# Patient Record
Sex: FEMALE | Race: WHITE | NOT HISPANIC OR LATINO | Employment: OTHER | URBAN - METROPOLITAN AREA
[De-identification: names, ages, dates, MRNs, and addresses within clinical notes are randomized per-mention and may not be internally consistent; named-entity substitution may affect disease eponyms.]

---

## 2020-04-28 ENCOUNTER — APPOINTMENT (OUTPATIENT)
Dept: GENERAL RADIOLOGY | Facility: HOSPITAL | Age: 85
End: 2020-04-28

## 2020-04-28 ENCOUNTER — HOSPITAL ENCOUNTER (INPATIENT)
Facility: HOSPITAL | Age: 85
LOS: 6 days | Discharge: NURSING, REHAB OR LONG-TERM CARE | End: 2020-05-04
Attending: EMERGENCY MEDICINE | Admitting: HOSPITALIST

## 2020-04-28 DIAGNOSIS — F03.91 DEMENTIA WITH BEHAVIORAL DISTURBANCE, UNSPECIFIED DEMENTIA TYPE: Primary | ICD-10-CM

## 2020-04-28 DIAGNOSIS — U07.1 COVID-19 VIRUS DETECTED: ICD-10-CM

## 2020-04-28 DIAGNOSIS — Z20.822 CLOSE EXPOSURE TO COVID-19 VIRUS: ICD-10-CM

## 2020-04-28 PROBLEM — I10 HYPERTENSION: Status: ACTIVE | Noted: 2020-04-28

## 2020-04-28 PROBLEM — C80.1 CANCER (HCC): Status: ACTIVE | Noted: 2020-04-28

## 2020-04-28 PROBLEM — F03.918 DEMENTIA WITH BEHAVIORAL DISTURBANCE (HCC): Status: ACTIVE | Noted: 2020-04-28

## 2020-04-28 PROBLEM — E11.9 DIABETES MELLITUS (HCC): Status: ACTIVE | Noted: 2020-04-28

## 2020-04-28 LAB
ALBUMIN SERPL-MCNC: 3.6 G/DL (ref 3.5–5.2)
ALBUMIN/GLOB SERPL: 1.3 G/DL
ALP SERPL-CCNC: 79 U/L (ref 39–117)
ALT SERPL W P-5'-P-CCNC: 15 U/L (ref 1–33)
AMPHET+METHAMPHET UR QL: NEGATIVE
ANION GAP SERPL CALCULATED.3IONS-SCNC: 10.7 MMOL/L (ref 5–15)
AST SERPL-CCNC: 15 U/L (ref 1–32)
BACTERIA UR QL AUTO: NORMAL /HPF
BARBITURATES UR QL SCN: NEGATIVE
BASOPHILS # BLD AUTO: 0.03 10*3/MM3 (ref 0–0.2)
BASOPHILS NFR BLD AUTO: 0.5 % (ref 0–1.5)
BENZODIAZ UR QL SCN: NEGATIVE
BILIRUB SERPL-MCNC: 0.7 MG/DL (ref 0.2–1.2)
BILIRUB UR QL STRIP: NEGATIVE
BUN BLD-MCNC: 12 MG/DL (ref 8–23)
BUN/CREAT SERPL: 13 (ref 7–25)
CALCIUM SPEC-SCNC: 9.9 MG/DL (ref 8.6–10.5)
CANNABINOIDS SERPL QL: NEGATIVE
CHLORIDE SERPL-SCNC: 95 MMOL/L (ref 98–107)
CLARITY UR: ABNORMAL
CO2 SERPL-SCNC: 25.3 MMOL/L (ref 22–29)
COCAINE UR QL: NEGATIVE
COLOR UR: YELLOW
CREAT BLD-MCNC: 0.92 MG/DL (ref 0.57–1)
DEPRECATED RDW RBC AUTO: 41.5 FL (ref 37–54)
EOSINOPHIL # BLD AUTO: 0.03 10*3/MM3 (ref 0–0.4)
EOSINOPHIL NFR BLD AUTO: 0.5 % (ref 0.3–6.2)
ERYTHROCYTE [DISTWIDTH] IN BLOOD BY AUTOMATED COUNT: 13.3 % (ref 12.3–15.4)
ETHANOL BLD-MCNC: <10 MG/DL (ref 0–10)
ETHANOL UR QL: <0.01 %
GFR SERPL CREATININE-BSD FRML MDRD: 58 ML/MIN/1.73
GFR SERPL CREATININE-BSD FRML MDRD: 70 ML/MIN/1.73
GLOBULIN UR ELPH-MCNC: 2.8 GM/DL
GLUCOSE BLD-MCNC: 128 MG/DL (ref 65–99)
GLUCOSE UR STRIP-MCNC: NEGATIVE MG/DL
HCT VFR BLD AUTO: 33.7 % (ref 34–46.6)
HGB BLD-MCNC: 11.5 G/DL (ref 12–15.9)
HGB UR QL STRIP.AUTO: NEGATIVE
HYALINE CASTS UR QL AUTO: NORMAL /LPF
IMM GRANULOCYTES # BLD AUTO: 0.04 10*3/MM3 (ref 0–0.05)
IMM GRANULOCYTES NFR BLD AUTO: 0.6 % (ref 0–0.5)
KETONES UR QL STRIP: NEGATIVE
LEUKOCYTE ESTERASE UR QL STRIP.AUTO: ABNORMAL
LYMPHOCYTES # BLD AUTO: 2.35 10*3/MM3 (ref 0.7–3.1)
LYMPHOCYTES NFR BLD AUTO: 36.6 % (ref 19.6–45.3)
MCH RBC QN AUTO: 29.3 PG (ref 26.6–33)
MCHC RBC AUTO-ENTMCNC: 34.1 G/DL (ref 31.5–35.7)
MCV RBC AUTO: 86 FL (ref 79–97)
METHADONE UR QL SCN: NEGATIVE
MONOCYTES # BLD AUTO: 0.55 10*3/MM3 (ref 0.1–0.9)
MONOCYTES NFR BLD AUTO: 8.6 % (ref 5–12)
NEUTROPHILS # BLD AUTO: 3.42 10*3/MM3 (ref 1.7–7)
NEUTROPHILS NFR BLD AUTO: 53.2 % (ref 42.7–76)
NITRITE UR QL STRIP: NEGATIVE
NRBC BLD AUTO-RTO: 0 /100 WBC (ref 0–0.2)
OPIATES UR QL: NEGATIVE
OXYCODONE UR QL SCN: NEGATIVE
PH UR STRIP.AUTO: 6.5 [PH] (ref 5–8)
PLATELET # BLD AUTO: 411 10*3/MM3 (ref 140–450)
PMV BLD AUTO: 9.8 FL (ref 6–12)
POTASSIUM BLD-SCNC: 3.4 MMOL/L (ref 3.5–5.2)
PROT SERPL-MCNC: 6.4 G/DL (ref 6–8.5)
PROT UR QL STRIP: NEGATIVE
RBC # BLD AUTO: 3.92 10*6/MM3 (ref 3.77–5.28)
RBC # UR: NORMAL /HPF
REF LAB TEST METHOD: NORMAL
SARS-COV-2 RNA RESP QL NAA+PROBE: DETECTED
SODIUM BLD-SCNC: 131 MMOL/L (ref 136–145)
SP GR UR STRIP: <=1.005 (ref 1–1.03)
SQUAMOUS #/AREA URNS HPF: NORMAL /HPF
UROBILINOGEN UR QL STRIP: ABNORMAL
WBC NRBC COR # BLD: 6.42 10*3/MM3 (ref 3.4–10.8)
WBC UR QL AUTO: NORMAL /HPF

## 2020-04-28 PROCEDURE — 80307 DRUG TEST PRSMV CHEM ANLYZR: CPT | Performed by: EMERGENCY MEDICINE

## 2020-04-28 PROCEDURE — 81001 URINALYSIS AUTO W/SCOPE: CPT | Performed by: PHYSICIAN ASSISTANT

## 2020-04-28 PROCEDURE — 71045 X-RAY EXAM CHEST 1 VIEW: CPT

## 2020-04-28 PROCEDURE — 87635 SARS-COV-2 COVID-19 AMP PRB: CPT | Performed by: PHYSICIAN ASSISTANT

## 2020-04-28 PROCEDURE — 93005 ELECTROCARDIOGRAM TRACING: CPT | Performed by: EMERGENCY MEDICINE

## 2020-04-28 PROCEDURE — 99284 EMERGENCY DEPT VISIT MOD MDM: CPT

## 2020-04-28 PROCEDURE — 85025 COMPLETE CBC W/AUTO DIFF WBC: CPT | Performed by: PHYSICIAN ASSISTANT

## 2020-04-28 PROCEDURE — 25010000002 ZIPRASIDONE MESYLATE PER 10 MG: Performed by: PHYSICIAN ASSISTANT

## 2020-04-28 PROCEDURE — 93010 ELECTROCARDIOGRAM REPORT: CPT | Performed by: INTERNAL MEDICINE

## 2020-04-28 PROCEDURE — 80053 COMPREHEN METABOLIC PANEL: CPT | Performed by: PHYSICIAN ASSISTANT

## 2020-04-28 PROCEDURE — 36415 COLL VENOUS BLD VENIPUNCTURE: CPT

## 2020-04-28 RX ORDER — ONDANSETRON 4 MG/1
4 TABLET, FILM COATED ORAL EVERY 6 HOURS PRN
Status: DISCONTINUED | OUTPATIENT
Start: 2020-04-28 | End: 2020-05-04 | Stop reason: HOSPADM

## 2020-04-28 RX ORDER — ACETAMINOPHEN 325 MG/1
650 TABLET ORAL EVERY 4 HOURS PRN
Status: DISCONTINUED | OUTPATIENT
Start: 2020-04-28 | End: 2020-05-04 | Stop reason: HOSPADM

## 2020-04-28 RX ORDER — METOPROLOL TARTRATE 50 MG/1
50 TABLET, FILM COATED ORAL EVERY 12 HOURS SCHEDULED
Status: DISCONTINUED | OUTPATIENT
Start: 2020-04-28 | End: 2020-05-04 | Stop reason: HOSPADM

## 2020-04-28 RX ORDER — ONDANSETRON 2 MG/ML
4 INJECTION INTRAMUSCULAR; INTRAVENOUS EVERY 6 HOURS PRN
Status: DISCONTINUED | OUTPATIENT
Start: 2020-04-28 | End: 2020-05-04 | Stop reason: HOSPADM

## 2020-04-28 RX ORDER — SPIRONOLACTONE 25 MG/1
25 TABLET ORAL DAILY
Status: DISCONTINUED | OUTPATIENT
Start: 2020-04-29 | End: 2020-05-04 | Stop reason: HOSPADM

## 2020-04-28 RX ORDER — ZIPRASIDONE MESYLATE 20 MG/ML
2.5 INJECTION, POWDER, LYOPHILIZED, FOR SOLUTION INTRAMUSCULAR ONCE
Status: COMPLETED | OUTPATIENT
Start: 2020-04-28 | End: 2020-04-28

## 2020-04-28 RX ORDER — GABAPENTIN 100 MG/1
100 CAPSULE ORAL
COMMUNITY

## 2020-04-28 RX ORDER — METOPROLOL TARTRATE 50 MG/1
50 TABLET, FILM COATED ORAL 2 TIMES DAILY
COMMUNITY

## 2020-04-28 RX ORDER — ACETAMINOPHEN 650 MG/1
650 SUPPOSITORY RECTAL EVERY 4 HOURS PRN
Status: DISCONTINUED | OUTPATIENT
Start: 2020-04-28 | End: 2020-05-04 | Stop reason: HOSPADM

## 2020-04-28 RX ORDER — AMLODIPINE BESYLATE 10 MG/1
10 TABLET ORAL DAILY
COMMUNITY

## 2020-04-28 RX ORDER — SODIUM CHLORIDE 0.9 % (FLUSH) 0.9 %
10 SYRINGE (ML) INJECTION AS NEEDED
Status: DISCONTINUED | OUTPATIENT
Start: 2020-04-28 | End: 2020-05-04 | Stop reason: HOSPADM

## 2020-04-28 RX ORDER — AMLODIPINE BESYLATE 10 MG/1
10 TABLET ORAL DAILY
Status: DISCONTINUED | OUTPATIENT
Start: 2020-04-29 | End: 2020-05-04 | Stop reason: HOSPADM

## 2020-04-28 RX ORDER — ZIPRASIDONE MESYLATE 20 MG/ML
5 INJECTION, POWDER, LYOPHILIZED, FOR SOLUTION INTRAMUSCULAR ONCE
Status: DISCONTINUED | OUTPATIENT
Start: 2020-04-28 | End: 2020-04-28 | Stop reason: SDUPTHER

## 2020-04-28 RX ORDER — ACETAMINOPHEN 160 MG/5ML
650 SOLUTION ORAL EVERY 4 HOURS PRN
Status: DISCONTINUED | OUTPATIENT
Start: 2020-04-28 | End: 2020-05-04 | Stop reason: HOSPADM

## 2020-04-28 RX ORDER — ASPIRIN 81 MG/1
81 TABLET, CHEWABLE ORAL DAILY
COMMUNITY

## 2020-04-28 RX ORDER — QUETIAPINE FUMARATE 25 MG/1
25 TABLET, FILM COATED ORAL NIGHTLY
Status: DISCONTINUED | OUTPATIENT
Start: 2020-04-28 | End: 2020-05-04 | Stop reason: HOSPADM

## 2020-04-28 RX ORDER — SODIUM CHLORIDE 0.9 % (FLUSH) 0.9 %
10 SYRINGE (ML) INJECTION EVERY 12 HOURS SCHEDULED
Status: DISCONTINUED | OUTPATIENT
Start: 2020-04-28 | End: 2020-05-04 | Stop reason: HOSPADM

## 2020-04-28 RX ORDER — SPIRONOLACTONE 25 MG/1
25 TABLET ORAL DAILY
COMMUNITY

## 2020-04-28 RX ORDER — LOSARTAN POTASSIUM 50 MG/1
50 TABLET ORAL DAILY
COMMUNITY

## 2020-04-28 RX ORDER — ZIPRASIDONE MESYLATE 20 MG/ML
5 INJECTION, POWDER, LYOPHILIZED, FOR SOLUTION INTRAMUSCULAR ONCE
Status: COMPLETED | OUTPATIENT
Start: 2020-04-28 | End: 2020-04-28

## 2020-04-28 RX ORDER — QUETIAPINE FUMARATE 25 MG/1
25 TABLET, FILM COATED ORAL NIGHTLY
COMMUNITY

## 2020-04-28 RX ORDER — LOSARTAN POTASSIUM 50 MG/1
50 TABLET ORAL DAILY
Status: DISCONTINUED | OUTPATIENT
Start: 2020-04-29 | End: 2020-05-04 | Stop reason: HOSPADM

## 2020-04-28 RX ORDER — ASPIRIN 81 MG/1
81 TABLET, CHEWABLE ORAL DAILY
Status: DISCONTINUED | OUTPATIENT
Start: 2020-04-29 | End: 2020-05-04 | Stop reason: HOSPADM

## 2020-04-28 RX ADMIN — METOPROLOL TARTRATE 50 MG: 25 TABLET ORAL at 22:33

## 2020-04-28 RX ADMIN — QUETIAPINE FUMARATE 25 MG: 25 TABLET ORAL at 22:33

## 2020-04-28 RX ADMIN — ZIPRASIDONE MESYLATE 5 MG: 20 INJECTION, POWDER, LYOPHILIZED, FOR SOLUTION INTRAMUSCULAR at 17:32

## 2020-04-28 RX ADMIN — ZIPRASIDONE MESYLATE 2.5 MG: 20 INJECTION, POWDER, LYOPHILIZED, FOR SOLUTION INTRAMUSCULAR at 16:01

## 2020-04-29 PROBLEM — R62.7 FAILURE TO THRIVE IN ADULT: Status: ACTIVE | Noted: 2020-04-29

## 2020-04-29 LAB
ALBUMIN SERPL-MCNC: 3.2 G/DL (ref 3.5–5.2)
ALBUMIN/GLOB SERPL: 1.2 G/DL
ALP SERPL-CCNC: 72 U/L (ref 39–117)
ALT SERPL W P-5'-P-CCNC: 12 U/L (ref 1–33)
ANION GAP SERPL CALCULATED.3IONS-SCNC: 10.3 MMOL/L (ref 5–15)
AST SERPL-CCNC: 10 U/L (ref 1–32)
BASOPHILS # BLD AUTO: 0.03 10*3/MM3 (ref 0–0.2)
BASOPHILS NFR BLD AUTO: 0.5 % (ref 0–1.5)
BILIRUB SERPL-MCNC: 0.7 MG/DL (ref 0.2–1.2)
BUN BLD-MCNC: 11 MG/DL (ref 8–23)
BUN/CREAT SERPL: 12.6 (ref 7–25)
CALCIUM SPEC-SCNC: 9.2 MG/DL (ref 8.6–10.5)
CHLORIDE SERPL-SCNC: 103 MMOL/L (ref 98–107)
CO2 SERPL-SCNC: 25.7 MMOL/L (ref 22–29)
CREAT BLD-MCNC: 0.87 MG/DL (ref 0.57–1)
CRP SERPL-MCNC: 0.64 MG/DL (ref 0–0.5)
DEPRECATED RDW RBC AUTO: 41.3 FL (ref 37–54)
EOSINOPHIL # BLD AUTO: 0.1 10*3/MM3 (ref 0–0.4)
EOSINOPHIL NFR BLD AUTO: 1.6 % (ref 0.3–6.2)
ERYTHROCYTE [DISTWIDTH] IN BLOOD BY AUTOMATED COUNT: 13.2 % (ref 12.3–15.4)
FERRITIN SERPL-MCNC: 173 NG/ML (ref 13–150)
GFR SERPL CREATININE-BSD FRML MDRD: 62 ML/MIN/1.73
GLOBULIN UR ELPH-MCNC: 2.6 GM/DL
GLUCOSE BLD-MCNC: 105 MG/DL (ref 65–99)
HBA1C MFR BLD: 6.26 % (ref 4.8–5.6)
HCT VFR BLD AUTO: 32.4 % (ref 34–46.6)
HGB BLD-MCNC: 11.2 G/DL (ref 12–15.9)
IMM GRANULOCYTES # BLD AUTO: 0.05 10*3/MM3 (ref 0–0.05)
IMM GRANULOCYTES NFR BLD AUTO: 0.8 % (ref 0–0.5)
LDH SERPL-CCNC: 167 U/L (ref 135–214)
LYMPHOCYTES # BLD AUTO: 2.11 10*3/MM3 (ref 0.7–3.1)
LYMPHOCYTES NFR BLD AUTO: 34.1 % (ref 19.6–45.3)
MCH RBC QN AUTO: 29.5 PG (ref 26.6–33)
MCHC RBC AUTO-ENTMCNC: 34.6 G/DL (ref 31.5–35.7)
MCV RBC AUTO: 85.3 FL (ref 79–97)
MONOCYTES # BLD AUTO: 0.52 10*3/MM3 (ref 0.1–0.9)
MONOCYTES NFR BLD AUTO: 8.4 % (ref 5–12)
NEUTROPHILS # BLD AUTO: 3.37 10*3/MM3 (ref 1.7–7)
NEUTROPHILS NFR BLD AUTO: 54.6 % (ref 42.7–76)
NRBC BLD AUTO-RTO: 0 /100 WBC (ref 0–0.2)
PLATELET # BLD AUTO: 446 10*3/MM3 (ref 140–450)
PMV BLD AUTO: 9.8 FL (ref 6–12)
POTASSIUM BLD-SCNC: 3.8 MMOL/L (ref 3.5–5.2)
PROT SERPL-MCNC: 5.8 G/DL (ref 6–8.5)
RBC # BLD AUTO: 3.8 10*6/MM3 (ref 3.77–5.28)
SODIUM BLD-SCNC: 139 MMOL/L (ref 136–145)
WBC NRBC COR # BLD: 6.18 10*3/MM3 (ref 3.4–10.8)

## 2020-04-29 PROCEDURE — 82728 ASSAY OF FERRITIN: CPT | Performed by: HOSPITALIST

## 2020-04-29 PROCEDURE — 83615 LACTATE (LD) (LDH) ENZYME: CPT | Performed by: HOSPITALIST

## 2020-04-29 PROCEDURE — 80053 COMPREHEN METABOLIC PANEL: CPT | Performed by: HOSPITALIST

## 2020-04-29 PROCEDURE — 85025 COMPLETE CBC W/AUTO DIFF WBC: CPT | Performed by: HOSPITALIST

## 2020-04-29 PROCEDURE — 86140 C-REACTIVE PROTEIN: CPT | Performed by: NURSE PRACTITIONER

## 2020-04-29 PROCEDURE — 83036 HEMOGLOBIN GLYCOSYLATED A1C: CPT | Performed by: NURSE PRACTITIONER

## 2020-04-29 RX ORDER — OLANZAPINE 10 MG/1
5 INJECTION, POWDER, LYOPHILIZED, FOR SOLUTION INTRAMUSCULAR EVERY 8 HOURS PRN
Status: DISCONTINUED | OUTPATIENT
Start: 2020-04-29 | End: 2020-05-04 | Stop reason: HOSPADM

## 2020-04-29 RX ADMIN — SPIRONOLACTONE 25 MG: 25 TABLET, FILM COATED ORAL at 08:05

## 2020-04-29 RX ADMIN — METOPROLOL TARTRATE 50 MG: 25 TABLET ORAL at 08:05

## 2020-04-29 RX ADMIN — SODIUM CHLORIDE, PRESERVATIVE FREE 10 ML: 5 INJECTION INTRAVENOUS at 21:06

## 2020-04-29 RX ADMIN — LOSARTAN POTASSIUM 50 MG: 50 TABLET, FILM COATED ORAL at 08:05

## 2020-04-29 RX ADMIN — QUETIAPINE FUMARATE 25 MG: 25 TABLET ORAL at 21:04

## 2020-04-29 RX ADMIN — AMLODIPINE BESYLATE 10 MG: 10 TABLET ORAL at 08:05

## 2020-04-29 RX ADMIN — SODIUM CHLORIDE, PRESERVATIVE FREE 10 ML: 5 INJECTION INTRAVENOUS at 08:17

## 2020-04-29 RX ADMIN — ASPIRIN 81 MG: 81 TABLET, CHEWABLE ORAL at 08:05

## 2020-04-29 RX ADMIN — METOPROLOL TARTRATE 50 MG: 25 TABLET ORAL at 21:04

## 2020-04-29 RX ADMIN — ACETAMINOPHEN 650 MG: 325 TABLET, FILM COATED ORAL at 08:05

## 2020-04-30 LAB
ALBUMIN SERPL-MCNC: 3.6 G/DL (ref 3.5–5.2)
ALBUMIN/GLOB SERPL: 1.3 G/DL
ALP SERPL-CCNC: 80 U/L (ref 39–117)
ALT SERPL W P-5'-P-CCNC: 22 U/L (ref 1–33)
ANION GAP SERPL CALCULATED.3IONS-SCNC: 10.8 MMOL/L (ref 5–15)
AST SERPL-CCNC: 20 U/L (ref 1–32)
BASOPHILS # BLD AUTO: 0.05 10*3/MM3 (ref 0–0.2)
BASOPHILS NFR BLD AUTO: 0.6 % (ref 0–1.5)
BILIRUB SERPL-MCNC: 0.8 MG/DL (ref 0.2–1.2)
BUN BLD-MCNC: 14 MG/DL (ref 8–23)
BUN/CREAT SERPL: 14.3 (ref 7–25)
CALCIUM SPEC-SCNC: 9.8 MG/DL (ref 8.6–10.5)
CHLORIDE SERPL-SCNC: 97 MMOL/L (ref 98–107)
CO2 SERPL-SCNC: 27.2 MMOL/L (ref 22–29)
CREAT BLD-MCNC: 0.98 MG/DL (ref 0.57–1)
D-LACTATE SERPL-SCNC: 0.7 MMOL/L (ref 0.5–2)
DEPRECATED RDW RBC AUTO: 42 FL (ref 37–54)
EOSINOPHIL # BLD AUTO: 0.11 10*3/MM3 (ref 0–0.4)
EOSINOPHIL NFR BLD AUTO: 1.3 % (ref 0.3–6.2)
ERYTHROCYTE [DISTWIDTH] IN BLOOD BY AUTOMATED COUNT: 13.3 % (ref 12.3–15.4)
GFR SERPL CREATININE-BSD FRML MDRD: 54 ML/MIN/1.73
GLOBULIN UR ELPH-MCNC: 2.8 GM/DL
GLUCOSE BLD-MCNC: 128 MG/DL (ref 65–99)
HCT VFR BLD AUTO: 38 % (ref 34–46.6)
HGB BLD-MCNC: 12.9 G/DL (ref 12–15.9)
IMM GRANULOCYTES # BLD AUTO: 0.08 10*3/MM3 (ref 0–0.05)
IMM GRANULOCYTES NFR BLD AUTO: 1 % (ref 0–0.5)
LYMPHOCYTES # BLD AUTO: 2.52 10*3/MM3 (ref 0.7–3.1)
LYMPHOCYTES NFR BLD AUTO: 30.8 % (ref 19.6–45.3)
MCH RBC QN AUTO: 29.5 PG (ref 26.6–33)
MCHC RBC AUTO-ENTMCNC: 33.9 G/DL (ref 31.5–35.7)
MCV RBC AUTO: 86.8 FL (ref 79–97)
MONOCYTES # BLD AUTO: 0.44 10*3/MM3 (ref 0.1–0.9)
MONOCYTES NFR BLD AUTO: 5.4 % (ref 5–12)
NEUTROPHILS # BLD AUTO: 4.99 10*3/MM3 (ref 1.7–7)
NEUTROPHILS NFR BLD AUTO: 60.9 % (ref 42.7–76)
NRBC BLD AUTO-RTO: 0 /100 WBC (ref 0–0.2)
PLATELET # BLD AUTO: 540 10*3/MM3 (ref 140–450)
PMV BLD AUTO: 9.2 FL (ref 6–12)
POTASSIUM BLD-SCNC: 4 MMOL/L (ref 3.5–5.2)
PROT SERPL-MCNC: 6.4 G/DL (ref 6–8.5)
RBC # BLD AUTO: 4.38 10*6/MM3 (ref 3.77–5.28)
SODIUM BLD-SCNC: 135 MMOL/L (ref 136–145)
TROPONIN T SERPL-MCNC: <0.01 NG/ML (ref 0–0.03)
WBC NRBC COR # BLD: 8.19 10*3/MM3 (ref 3.4–10.8)

## 2020-04-30 PROCEDURE — 85025 COMPLETE CBC W/AUTO DIFF WBC: CPT | Performed by: HOSPITALIST

## 2020-04-30 PROCEDURE — 83605 ASSAY OF LACTIC ACID: CPT | Performed by: HOSPITALIST

## 2020-04-30 PROCEDURE — 84484 ASSAY OF TROPONIN QUANT: CPT | Performed by: HOSPITALIST

## 2020-04-30 PROCEDURE — 97162 PT EVAL MOD COMPLEX 30 MIN: CPT

## 2020-04-30 PROCEDURE — 80053 COMPREHEN METABOLIC PANEL: CPT | Performed by: HOSPITALIST

## 2020-04-30 PROCEDURE — 97110 THERAPEUTIC EXERCISES: CPT

## 2020-04-30 PROCEDURE — 90791 PSYCH DIAGNOSTIC EVALUATION: CPT | Performed by: SOCIAL WORKER

## 2020-04-30 RX ORDER — ONDANSETRON 4 MG/1
4 TABLET, FILM COATED ORAL EVERY 6 HOURS PRN
Status: DISCONTINUED | OUTPATIENT
Start: 2020-04-30 | End: 2020-05-04 | Stop reason: HOSPADM

## 2020-04-30 RX ORDER — NITROGLYCERIN 0.4 MG/1
0.4 TABLET SUBLINGUAL
Status: DISCONTINUED | OUTPATIENT
Start: 2020-04-30 | End: 2020-05-04 | Stop reason: HOSPADM

## 2020-04-30 RX ORDER — ONDANSETRON 2 MG/ML
4 INJECTION INTRAMUSCULAR; INTRAVENOUS EVERY 6 HOURS PRN
Status: DISCONTINUED | OUTPATIENT
Start: 2020-04-30 | End: 2020-05-04 | Stop reason: HOSPADM

## 2020-04-30 RX ORDER — ACETAMINOPHEN 325 MG/1
650 TABLET ORAL EVERY 4 HOURS PRN
Status: DISCONTINUED | OUTPATIENT
Start: 2020-04-30 | End: 2020-05-04 | Stop reason: HOSPADM

## 2020-04-30 RX ADMIN — METOPROLOL TARTRATE 50 MG: 25 TABLET ORAL at 23:12

## 2020-04-30 RX ADMIN — LOSARTAN POTASSIUM 50 MG: 50 TABLET, FILM COATED ORAL at 08:23

## 2020-04-30 RX ADMIN — SODIUM CHLORIDE, PRESERVATIVE FREE 10 ML: 5 INJECTION INTRAVENOUS at 08:24

## 2020-04-30 RX ADMIN — ASPIRIN 81 MG: 81 TABLET, CHEWABLE ORAL at 08:23

## 2020-04-30 RX ADMIN — SPIRONOLACTONE 25 MG: 25 TABLET, FILM COATED ORAL at 08:23

## 2020-04-30 RX ADMIN — OLANZAPINE 5 MG: 10 INJECTION, POWDER, FOR SOLUTION INTRAMUSCULAR at 23:13

## 2020-04-30 RX ADMIN — METOPROLOL TARTRATE 50 MG: 25 TABLET ORAL at 08:23

## 2020-04-30 RX ADMIN — AMLODIPINE BESYLATE 10 MG: 10 TABLET ORAL at 08:23

## 2020-04-30 RX ADMIN — QUETIAPINE FUMARATE 25 MG: 25 TABLET ORAL at 23:12

## 2020-05-01 LAB
ANION GAP SERPL CALCULATED.3IONS-SCNC: 11.3 MMOL/L (ref 5–15)
BUN BLD-MCNC: 14 MG/DL (ref 8–23)
BUN/CREAT SERPL: 16.9 (ref 7–25)
CALCIUM SPEC-SCNC: 9.5 MG/DL (ref 8.6–10.5)
CHLORIDE SERPL-SCNC: 102 MMOL/L (ref 98–107)
CO2 SERPL-SCNC: 25.7 MMOL/L (ref 22–29)
CREAT BLD-MCNC: 0.83 MG/DL (ref 0.57–1)
DEPRECATED RDW RBC AUTO: 43 FL (ref 37–54)
ERYTHROCYTE [DISTWIDTH] IN BLOOD BY AUTOMATED COUNT: 13.2 % (ref 12.3–15.4)
GFR SERPL CREATININE-BSD FRML MDRD: 65 ML/MIN/1.73
GLUCOSE BLD-MCNC: 106 MG/DL (ref 65–99)
HCT VFR BLD AUTO: 37.9 % (ref 34–46.6)
HGB BLD-MCNC: 12.4 G/DL (ref 12–15.9)
MCH RBC QN AUTO: 29 PG (ref 26.6–33)
MCHC RBC AUTO-ENTMCNC: 32.7 G/DL (ref 31.5–35.7)
MCV RBC AUTO: 88.6 FL (ref 79–97)
PLATELET # BLD AUTO: 499 10*3/MM3 (ref 140–450)
PMV BLD AUTO: 9.3 FL (ref 6–12)
POTASSIUM BLD-SCNC: 3.7 MMOL/L (ref 3.5–5.2)
RBC # BLD AUTO: 4.28 10*6/MM3 (ref 3.77–5.28)
SODIUM BLD-SCNC: 139 MMOL/L (ref 136–145)
WBC NRBC COR # BLD: 7.05 10*3/MM3 (ref 3.4–10.8)

## 2020-05-01 PROCEDURE — 85027 COMPLETE CBC AUTOMATED: CPT | Performed by: NURSE PRACTITIONER

## 2020-05-01 PROCEDURE — 80048 BASIC METABOLIC PNL TOTAL CA: CPT | Performed by: NURSE PRACTITIONER

## 2020-05-01 RX ADMIN — SPIRONOLACTONE 25 MG: 25 TABLET, FILM COATED ORAL at 08:10

## 2020-05-01 RX ADMIN — AMLODIPINE BESYLATE 10 MG: 10 TABLET ORAL at 08:10

## 2020-05-01 RX ADMIN — SODIUM CHLORIDE, PRESERVATIVE FREE 10 ML: 5 INJECTION INTRAVENOUS at 21:00

## 2020-05-01 RX ADMIN — SODIUM CHLORIDE, PRESERVATIVE FREE 10 ML: 5 INJECTION INTRAVENOUS at 08:11

## 2020-05-01 RX ADMIN — LOSARTAN POTASSIUM 50 MG: 50 TABLET, FILM COATED ORAL at 08:10

## 2020-05-01 RX ADMIN — OLANZAPINE 5 MG: 10 INJECTION, POWDER, FOR SOLUTION INTRAMUSCULAR at 22:37

## 2020-05-01 RX ADMIN — ASPIRIN 81 MG: 81 TABLET, CHEWABLE ORAL at 08:10

## 2020-05-01 NOTE — PROGRESS NOTES
Name: Charlene Ruelas ADMIT: 2020   : 1933  PCP: System, Provider Not In    MRN: [id number] LOS: 3 days   AGE/SEX: 87 y.o. female  ROOM: S402/1     Subjective   Subjective   Patient awake sitting up in bed with sitter at bedside. Denies SI at this time. (patient made suicidal threats to staff) Oriented to person only.  She wants to go home  Review of Systems cannot evaluate ROS due to dementia.  She denies complaint at this time.  She denies chest pain, shortness of breath, or abdominal pain.     Objective   Objective   Vital Signs  Temp:  [97.1 °F (36.2 °C)-97.5 °F (36.4 °C)] 97.5 °F (36.4 °C)  Heart Rate:  [58-99] 58  Resp:  [16-18] 18  BP: (139-150)/(63-77) 139/63  SpO2:  [94 %] 94 %  on   ;   Device (Oxygen Therapy): room air  Body mass index is 19.99 kg/m².  Physical Exam   Constitutional: She appears well-developed and well-nourished. No distress.   Frail elderly female oriented to person only   HENT:   Head: Normocephalic and atraumatic.   Dry oral mucosa   Eyes: Conjunctivae and EOM are normal.   Neck: Normal range of motion.   Cardiovascular: Normal rate and regular rhythm.   Pulmonary/Chest: Effort normal. No accessory muscle usage. No respiratory distress. She has decreased breath sounds.   Dry cough several times while in the room   Abdominal: Soft. Bowel sounds are normal. She exhibits no distension. There is no tenderness.   Musculoskeletal: She exhibits no edema.   Neurological: She is alert.   Confused   Skin: Skin is warm and dry.   Psychiatric: Her behavior is normal. Cognition and memory are impaired (She cannot tell me the date, place, name of family members).   Nursing note and vitals reviewed.      Results Review:       I reviewed the patient's new clinical results.  Results from last 7 days   Lab Units 20  0543 20  1150 20  0636 20  1615   WBC 10*3/mm3 7.05 8.19 6.18 6.42   HEMOGLOBIN g/dL 12.4 12.9 11.2* 11.5*   PLATELETS 10*3/mm3 499* 540* 446 411          Results from last 7 days   Lab Units 05/01/20  0543 04/30/20  1150 04/29/20  0636 04/28/20  1447   SODIUM mmol/L 139 135* 139 131*   POTASSIUM mmol/L 3.7 4.0 3.8 3.4*   CHLORIDE mmol/L 102 97* 103 95*   CO2 mmol/L 25.7 27.2 25.7 25.3   BUN mg/dL 14 14 11 12   CREATININE mg/dL 0.83 0.98 0.87 0.92   GLUCOSE mg/dL 106* 128* 105* 128*   Estimated Creatinine Clearance: 41.1 mL/min (by C-G formula based on SCr of 0.83 mg/dL).  Results from last 7 days   Lab Units 04/30/20  1150 04/29/20  0636 04/28/20  1447   ALBUMIN g/dL 3.60 3.20* 3.60   BILIRUBIN mg/dL 0.8 0.7 0.7   ALK PHOS U/L 80 72 79   AST (SGOT) U/L 20 10 15   ALT (SGPT) U/L 22 12 15     Results from last 7 days   Lab Units 05/01/20  0543 04/30/20  1150 04/29/20  0636 04/28/20  1447   CALCIUM mg/dL 9.5 9.8 9.2 9.9   ALBUMIN g/dL  --  3.60 3.20* 3.60     Results from last 7 days   Lab Units 04/30/20  1150   LACTATE mmol/L 0.7     Hemoglobin A1C   Date/Time Value Ref Range Status   04/29/2020 0636 6.26 (H) 4.80 - 5.60 % Final         amLODIPine 10 mg Oral Daily   aspirin 81 mg Oral Daily   enoxaparin 40 mg Subcutaneous Q24H   losartan 50 mg Oral Daily   metoprolol tartrate 50 mg Oral Q12H   QUEtiapine 25 mg Oral Nightly   sodium chloride 10 mL Intravenous Q12H   spironolactone 25 mg Oral Daily   .llhgb   Diet Regular; Safe Tray       Assessment/Plan     Active Hospital Problems    Diagnosis  POA   • **Dementia with behavioral disturbance (CMS/HCC) [F03.91]  Yes   • Failure to thrive in adult [R62.7]  Yes   • Close Exposure to Covid-19 Virus [Z20.828]  Yes   • Hypertension [I10]  Yes   • Diabetes mellitus (CMS/HCC) [E11.9]  Yes   • Cancer (CMS/HCC) [C80.1]  Yes   • COVID-19 virus detected [U07.1]  Yes      Resolved Hospital Problems   No resolved problems to display.       87 y.o. female admitted with Dementia with behavioral disturbance (CMS/HCC) and positive COVID19.      Dementia with behavioral disturbance  · Patient living at home alone since the death of  her  2 weeks ago.  She has known dementia chart decline mentation since death of spouse  · APS notified and access following  · Consult psychiatry given SI threats. D/C SI monitoring when ok with psych/ access. Patient is confused and doubt this was a real threat.   · Family agrees patient will need long-term care/memory unit at discharge  · zyprexa PRN and sitter at bedside     COVID-19  · Relatively asymptomatic.  Continue supportive care  · Monitor closely given age and comorbidities  · Cough present but no dyspnea    Hyponatremia/hypokalemia  · Secondary to suspected decreased intake   · Resolved. Repeat morning labs     Failure to thrive/anorexia  · Subjective weight loss per family with minimal p.o. intake in several weeks  · Monitor intake, daily weights  · Nutrition consult     DM2- diet only.  A1c 6.2  HTN- Continue current regimen. Monitor for hypotentsion     · SCDs for DVT prophylaxis.  · Full code.  · Discussed with patient and nursing staff.  · Anticipate discharge to SNU facility       While in the room and during my examination of the patient I wore gloves, gown, mask, eye protection and followed enhanced droplet/contact isolation protocol and precautions.  I washed my hands before and after this patient encounter.       Family confirms mental status is at baseline.  Family wants to keep patient a full code.  DC to [facility].  Cannot discharge patient until deemed not a suicide threat by psychiatry and she must be without sitter requirement for 24 hours.  [facility] will not admit patient over the weekend.  She would then potentially be discharged to [facility].    Greater than 50% of time spent in counseling and/or coordination of care. Total face/floor time 35 minutes.    MADELYN Man  [facility]  05/01/20  12:40

## 2020-05-01 NOTE — NURSING NOTE
"Checked on the patient per bed alarm; pt agitated saying \"I'm gonna kill myself.\"  When asked if she had a plan, she said \"I'm gonna use this.\"  And she motioned to the oxygen cord; pt since placed on suicide precautions with a sitter at bedside   "

## 2020-05-01 NOTE — CONSULTS
"Purpose of the visit was to evaluate for: goals of care/advanced care planning. Spoke with RN and CCP as well as family and discussed resuscitation status.      Assessment:    Patient is palliative care appropriate for community based services with [facility] or SNF with palliative care (list reasons): dementia       Recommendations/Plan: Continue current treatment plan.    Other Comments: Spoke with MADELYN Alcazar this morning and discussed need for palliative care consult. Also spoke with LUCIAN Lora and discharge plan is for patient to go to [facility] today at 1500. Spoke with patient's son (Oh) by phone to discuss code status. Patient is currently a full code. Informed him of patient's current condition. Brought up code status of full code and son states they have discussed this previously this admission. He states his mother is \"physically fit\" and he has spoken with his 3 other siblings and they want their mother \"to have a chance a life\". We discussed CPR and her dementia diagnosis. He comprehends this. He asked if her code status could be changed at any time and I informed him it could. Provided palliative care contact information. Informed MADELYN Alcazar of discussion. Will sign off.     Total consult time: 30 minutes     Thank you for the opportunity to assist in this patient's care.     Tamara JOHNSONN, RN, CHPN  "

## 2020-05-01 NOTE — PROGRESS NOTES
Continued Stay Note  Psychiatric     Patient Name: Charlene Ruelas  MRN: [id number]  Today's Date: 5/1/2020    Admit Date: 4/28/2020    Discharge Plan     Row Name 05/01/20 1150       Plan    Plan  Once sitter free for greater than 24 hours [facility] will accept for skilled rehab with plan to transition to [facility] memory care vs [facility]    Plan Comments  Spoke with Enmanuel Ponce/ [facility].  She states that she needs to be sitter free for greater than 24 hours then could be accepted for skilled rehab at [facility] with plans to transition to [facility] memory care vs [facility] once symptom free and negative Covid testing.  [facility] does not accept admissions on weekends, will reevaluate on Monday 5/4/2020.  Palliative was consulted today and full code status remains.  Spoke with son, Emery Ruelas at [phone].  He is one of the patients sons and states that they are all in agreement with these plans.  .........................Geno Muro RN        Discharge Codes    No documentation.             Geno Muro RN

## 2020-05-01 NOTE — PLAN OF CARE
Problem: Patient Care Overview  Goal: Plan of Care Review  Outcome: Ongoing (interventions implemented as appropriate)  Flowsheets  Taken 5/1/2020 9653  Progress: no change  Outcome Summary: Patient was extremely agitated at shift change saying that she wants to kill herself; pt now in suicide precautions with sitter at bedside through shift; pt initially refused medications, but allowed RN to give later in shift with PRN zyprexa IM; will continue to monitor  Taken 4/30/2020 1940  Plan of Care Reviewed With: patient     Problem: Suicide Risk (Adult)  Goal: Identify Related Risk Factors and Signs and Symptoms  Outcome: Ongoing (interventions implemented as appropriate)  Flowsheets (Taken 5/1/2020 5245)  Related Risk Factors (Suicide Risk): mental health diagnosis; threat to self-concept  Signs and Symptoms (Suicide Risk): anxiety; self-injurious behavior

## 2020-05-01 NOTE — PROGRESS NOTES
Reviewed chart and discussed w/ Bello.  Pt has been sleeping today.  Agitated last pm and required Zyprexa.  There is a palliative order now.  [facility] will not see pt. Individually until Covid neg.   Meds are ordered for pt for agitation.  Will continue to check w/ nursing staff.

## 2020-05-01 NOTE — NURSING NOTE
"Access center follow up:    Upon approach, pt is sitting in bed, 1:1 staff at bedside.  Pt was not wearing a mask.  This writer wore all appropriate ppe, including mask, goggles, gown and gloves.  Pt has an appropriate affect and mood.  She is pleasant and cooperative.  When asked if she was having thoughts of hurting herself, she states \"no, not at all, ask her\" and pointed to the staff in room.  Asked pt about statements she made regarding suicide and she states \"no I would never do that\" and reports she just lost her  \"4 months ago\" but it was actually 2 weeks ago.  She states they were  for 66 years and she just doesn't know what to do without him.  I again asked about any thoughts to harm herself and she started kissing her hands stating \" no,no, I would never do that\".  Pt reports that she is eager to \"go home\".  Report given to LIU Monsalve and More Mayen, .  "

## 2020-05-01 NOTE — PLAN OF CARE
MD discontinued sitter and suicide precautions.  Patient accepted to NH, but cannot go until Monday per CCP.

## 2020-05-01 NOTE — DISCHARGE SUMMARY
[facility]    Date of Discharge:  5/4/2020    PCP: System, Provider Not In    Discharge Diagnosis:   Active Hospital Problems    Diagnosis  POA   • **Dementia with behavioral disturbance (CMS/HCC) [F03.91]  Yes   • Failure to thrive in adult [R62.7]  Yes   • Close Exposure to Covid-19 Virus [Z20.828]  Yes   • Hypertension [I10]  Yes   • Diabetes mellitus (CMS/HCC) [E11.9]  Yes   • Cancer (CMS/HCC) [C80.1]  Yes   • COVID-19 virus detected [U07.1]  Yes   Severe malnutrition per [facility]   Resolved Hospital Problems   No resolved problems to display.      CONSULTS- Psychiatry, Palliative Care     Hospital Course  Please see history and physical for details. Patient is a 87 y.o. female with a history of dementia, diabetes controlled with diet only, HTN admitted with dementia with behavioral disturbance and positive COVID-19.  She had been exposed to her son who is also COVID-19 positive. Per family, patient has been wandering the neighborhood and distraught following the death of her  approximately 3 weeks ago.  He states she is confused but her dementia has become significantly worse in the last 2 weeks.  She has been relatively stable from a standpoint of COVID-19 with no respiratory distress and afebrile.  She has had minimal p.o. intake since admission and family confirms that she was not eating or drinking much prior to arrival.  Patient has been confused, agitated, and at times combative requiring Zyprexa. Psychiatry has been following for dementia with agitation and after patient made threats alluding to suicide.  She was placed on suicide precautions with sitter and restraints.  Psychiatry has given clearance to discontinue SI precautions.  Psychiatry and I do not feel there is any risk for suicide or suicidal ideation at this time and suspect most of her issues are related to situational depression, delirium and dementia. She has not required  restraints for over 24 hours. From a medical standpoint patient is stable to be discharged to [address].  Family has been notified and agree with plan over the phone.  All questions answered to their stated satisfaction.Family agrees that patient needs to facility with long-term memory care.  Attending provider and palliative care nurse has discussed with family changing her CODE STATUS but they have declined.      I discussed the patient's findings and my recommendations with patient, family and nursing staff and Dr. Razo.    Condition on Discharge: Improved.     Temp:  [96.7 °F (35.9 °C)-98 °F (36.7 °C)] 98 °F (36.7 °C)  Heart Rate:  [62-96] 70  Resp:  [17-20] 18  BP: ()/(59-82) 97/82  Body mass index is 19.3 kg/m².    Physical Exam   Constitutional: She appears well-developed and well-nourished. No distress.   Frail elderly    HENT:   Head: Normocephalic and atraumatic.   Mouth/Throat: Oropharynx is clear and moist.   Cardiovascular: Normal rate and regular rhythm.   Pulmonary/Chest: Effort normal and breath sounds normal. No respiratory distress.   Abdominal: Soft. Bowel sounds are normal. She exhibits no distension. There is no tenderness.   Musculoskeletal: She exhibits no edema.   Neurological: She is alert.   Oriented person only.    Skin: Skin is warm and dry.   Psychiatric: She has a normal mood and affect.   Calm at the moment but confused. Denies SI but states she wants to go home.    Vitals reviewed.       Discharge Medications      New Medications      Instructions Start Date   OLANZapine 2.5 MG tablet  Commonly known as:  ZyPREXA   2.5 mg, Oral, Daily         Continue These Medications      Instructions Start Date   amLODIPine 10 MG tablet  Commonly known as:  NORVASC   10 mg, Oral, Daily      aspirin 81 MG chewable tablet   81 mg, Oral, Daily      gabapentin 100 MG capsule  Commonly known as:  NEURONTIN   100 mg, Oral      losartan 50 MG tablet  Commonly known as:  COZAAR    50 mg, Oral, Daily      metoprolol tartrate 50 MG tablet  Commonly known as:  LOPRESSOR   50 mg, Oral, 2 Times Daily      QUEtiapine 25 MG tablet  Commonly known as:  SEROquel   25 mg, Oral, Nightly      spironolactone 25 MG tablet  Commonly known as:  ALDACTONE   25 mg, Oral, Daily            Diet Instructions     Diet: Regular      Discharge Diet:  Regular    Diet: Regular, Cardiac      Discharge Diet:   Regular  Cardiac            Activity Instructions     Activity as Tolerated      Activity as Tolerated           Follow-up Information     System, Provider Not In .    Contact information:  [facility] [address]                 Follow up with PCP. Discharge to [place] [address] unit today.     Test Results Pending at Discharge- none     Graeme Razo MD  05/04/20  11:33    Discharge time spent greater than 35 minutes.

## 2020-05-02 LAB — POTASSIUM BLD-SCNC: 3.9 MMOL/L (ref 3.5–5.2)

## 2020-05-02 PROCEDURE — 84132 ASSAY OF SERUM POTASSIUM: CPT | Performed by: HOSPITALIST

## 2020-05-02 RX ADMIN — OLANZAPINE 5 MG: 10 INJECTION, POWDER, FOR SOLUTION INTRAMUSCULAR at 12:00

## 2020-05-02 RX ADMIN — ASPIRIN 81 MG: 81 TABLET, CHEWABLE ORAL at 08:47

## 2020-05-02 RX ADMIN — METOPROLOL TARTRATE 50 MG: 25 TABLET ORAL at 08:47

## 2020-05-02 RX ADMIN — AMLODIPINE BESYLATE 10 MG: 10 TABLET ORAL at 08:47

## 2020-05-02 RX ADMIN — SODIUM CHLORIDE, PRESERVATIVE FREE 10 ML: 5 INJECTION INTRAVENOUS at 12:01

## 2020-05-02 RX ADMIN — SPIRONOLACTONE 25 MG: 25 TABLET, FILM COATED ORAL at 08:47

## 2020-05-02 RX ADMIN — OLANZAPINE 5 MG: 10 INJECTION, POWDER, FOR SOLUTION INTRAMUSCULAR at 20:51

## 2020-05-02 RX ADMIN — LOSARTAN POTASSIUM 50 MG: 50 TABLET, FILM COATED ORAL at 08:47

## 2020-05-02 NOTE — PLAN OF CARE
Problem: Patient Care Overview  Goal: Plan of Care Review  5/2/2020 0301 by Luisa Rodrigez RN  Outcome: Ongoing (interventions implemented as appropriate)  Flowsheets  Taken 5/1/2020 2319  Progress: no change  Taken 5/2/2020 0301  Plan of Care Reviewed With: patient;family  Outcome Summary: Pt agitated and restless at the beginning of the shift. Getting up repeatedly. Unable to be redirected. AAOx1. Placed in a vest restraint and given IM Zyprexa. Ambulating to the bathroom with standby assistance. VSS. Will CTM.  5/1/2020 2319 by Luisa Rodrigez RN  Flowsheets (Taken 5/1/2020 2319)  Progress: no change  Plan of Care Reviewed With: patient  Goal: Individualization and Mutuality  Outcome: Ongoing (interventions implemented as appropriate)  Goal: Discharge Needs Assessment  Outcome: Ongoing (interventions implemented as appropriate)  Goal: Interprofessional Rounds/Family Conf  Outcome: Ongoing (interventions implemented as appropriate)     Problem: Fall Risk (Adult)  Goal: Absence of Fall  Outcome: Ongoing (interventions implemented as appropriate)     Problem: Infection, Risk/Actual (Adult)  Goal: Infection Prevention/Resolution  Outcome: Ongoing (interventions implemented as appropriate)     Problem: Skin Injury Risk (Adult)  Goal: Skin Health and Integrity  Outcome: Ongoing (interventions implemented as appropriate)     Problem: Suicide Risk (Adult)  Goal: Identify Related Risk Factors and Signs and Symptoms  Outcome: Ongoing (interventions implemented as appropriate)  Goal: Strength-Based Wellness/Recovery  Outcome: Ongoing (interventions implemented as appropriate)  Goal: Physical Safety  Outcome: Ongoing (interventions implemented as appropriate)     Problem: Restraint, Nonbehavioral (Nonviolent)  Goal: Rationale and Justification  5/2/2020 0301 by Luisa Rodrigez RN  Outcome: Ongoing (interventions implemented as appropriate)  5/1/2020 2319 by Luisa Rodrigez RN  Outcome: Ongoing (interventions implemented as  appropriate)  Flowsheets (Taken 5/1/2020 3009)  Rationale and Justification: failure of less restrictive safety measures  Note:   Pt  repeatedly getting up putting her at high risk of a fall. Unable to follow verbal redirection due to dementia and paranoia. Vest restraint placed. Education for discontinuation of restraint attempted but no evidence of learning seen.   Goal: Nonbehavioral (Nonviolent) Restraint: Absence of Injury/Harm  Outcome: Ongoing (interventions implemented as appropriate)  Goal: Nonbehavioral (Nonviolent) Restraint: Achievement of Discontinuation Criteria  Outcome: Ongoing (interventions implemented as appropriate)  Goal: Nonbehavioral (Nonviolent) Restraint: Preservation of Dignity and Wellbeing  Outcome: Ongoing (interventions implemented as appropriate)

## 2020-05-02 NOTE — PLAN OF CARE
Problem: Restraint, Nonbehavioral (Nonviolent)  Goal: Rationale and Justification  Outcome: Ongoing (interventions implemented as appropriate)  Flowsheets (Taken 5/1/2020 1695)  Rationale and Justification: failure of less restrictive safety measures  Note:   Pt  repeatedly getting up putting her at high risk of a fall. Unable to follow verbal redirection due to dementia and paranoia. Vest restraint placed. Education for discontinuation of restraint attempted but no evidence of learning seen.

## 2020-05-02 NOTE — NURSING NOTE
Pt refusing PO meds and Lovenox. Zyprexa IM given. Exhibiting signs of paranoia. Getting out of bed repeatedly and unable to follow direction. Vest restraint placed. Will notify MD for order.

## 2020-05-02 NOTE — PLAN OF CARE
Problem: Patient Care Overview  Goal: Plan of Care Review  Outcome: Ongoing (interventions implemented as appropriate)  Flowsheets  Taken 5/1/2020 9980 by Luisa Rodrigez, RN  Progress: no change  Taken 5/2/2020 5986 by Gabrielle Cleary, RN  Outcome Summary: Pt continues to have bouts of agitation and restless during the shift. She remains confused to all but person and seems to have some hallucinations as she speaks of talking to a  in the room. Pt remains on rm air, tolorated being out of restraints today, remains afebrile, no c/o of pain, up to restroom, VSS- will continue to monitor.  Goal: Individualization and Mutuality  Outcome: Ongoing (interventions implemented as appropriate)  Goal: Discharge Needs Assessment  Outcome: Ongoing (interventions implemented as appropriate)  Goal: Interprofessional Rounds/Family Conf  Outcome: Ongoing (interventions implemented as appropriate)     Problem: Fall Risk (Adult)  Goal: Absence of Fall  Outcome: Ongoing (interventions implemented as appropriate)     Problem: Infection, Risk/Actual (Adult)  Goal: Infection Prevention/Resolution  Outcome: Ongoing (interventions implemented as appropriate)     Problem: Skin Injury Risk (Adult)  Goal: Skin Health and Integrity  Outcome: Ongoing (interventions implemented as appropriate)

## 2020-05-02 NOTE — NURSING NOTE
Son and sister-in-law both called and were informed that pt was agitated, aggressive, repeatedly getting up, and unable to follow direction and was, therefore, put into a vest restraint. They verbalized understanding. Will CTM.

## 2020-05-02 NOTE — PROGRESS NOTES
LOS: 4 days     Name: Charlene Ruelas  Age/Sex: 87 y.o. female  :  1933        PCP: System, Provider Not In  Chief Complaint   Patient presents with   • Depression     decreased intake, spouse passed away 2 weeks ago, refusing to take medications, family requesting psych eval      Subjective   She feels okay other than she just wants to die.  When I asked why she voices to me that she does not like being all by herself all the time.  This makes her very anxious.  Multiple times during the interview she did raise her voice with me and become a little bit agitated but was easily calmed down and redirected.  She is made somewhat of a mass in the bed and is asking to have the bed change.  She denies any shortness of breath or chest pain only mild nonproductive cough.    General: No Fever or Chills, Cardiac: No Chest Pain or Palpitations, Resp: No Cough or SOA, GI: No Nausea, Vomiting, or Diarrhea and Other: No bleeding      amLODIPine 10 mg Oral Daily   aspirin 81 mg Oral Daily   enoxaparin 40 mg Subcutaneous Q24H   losartan 50 mg Oral Daily   metoprolol tartrate 50 mg Oral Q12H   QUEtiapine 25 mg Oral Nightly   sodium chloride 10 mL Intravenous Q12H   spironolactone 25 mg Oral Daily          Objective   Vital Signs  Temp:  [96.6 °F (35.9 °C)-98.2 °F (36.8 °C)] 96.6 °F (35.9 °C)  Heart Rate:  [] 120  Resp:  [17-20] 18  BP: (114-145)/(54-86) 114/54  Body mass index is 20.14 kg/m².    Intake/Output Summary (Last 24 hours) at 2020 1458  Last data filed at 2020 1309  Gross per 24 hour   Intake 360 ml   Output --   Net 360 ml       Physical Exam   Constitutional: She appears well-developed and well-nourished.   HENT:   Head: Normocephalic and atraumatic.   Cardiovascular: Normal rate, regular rhythm and normal heart sounds.   Pulmonary/Chest: Effort normal and breath sounds normal.   Abdominal: Soft. Bowel sounds are normal.   Neurological: She is alert.   Skin: Skin is warm and dry. Capillary  refill takes less than 2 seconds.   Psychiatric: She has a normal mood and affect. Her behavior is normal.   Nursing note and vitals reviewed.        Results Review:       I reviewed the patient's new clinical results.  Results from last 7 days   Lab Units 05/01/20  0543 04/30/20  1150 04/29/20  0636 04/28/20  1615   WBC 10*3/mm3 7.05 8.19 6.18 6.42   HEMOGLOBIN g/dL 12.4 12.9 11.2* 11.5*   PLATELETS 10*3/mm3 499* 540* 446 411     Results from last 7 days   Lab Units 05/02/20  0717 05/01/20  0543 04/30/20  1150 04/29/20  0636 04/28/20  1447   SODIUM mmol/L  --  139 135* 139 131*   POTASSIUM mmol/L 3.9 3.7 4.0 3.8 3.4*   CHLORIDE mmol/L  --  102 97* 103 95*   CO2 mmol/L  --  25.7 27.2 25.7 25.3   BUN mg/dL  --  14 14 11 12   CREATININE mg/dL  --  0.83 0.98 0.87 0.92   CALCIUM mg/dL  --  9.5 9.8 9.2 9.9   Estimated Creatinine Clearance: 41.4 mL/min (by C-G formula based on SCr of 0.83 mg/dL).      Assessment/Plan     Dementia with behavioral disturbance (CMS/HCC)    Close Exposure to Covid-19 Virus    Hypertension    Diabetes mellitus (CMS/HCC)    Cancer (CMS/[facility])    COVID-19 virus detected    Failure to thrive in adult      PLAN  -She is dealing with hospital related delirium at the current time but also has significant dementia with behavioral disturbances noted at home.  She gave some vague SI threats over the course the hospitalization and tells me again today that she just wants to die.  She denies a plan.  She will need psychiatric clearance before the nursing home will accept her.  Awaiting psychiatry evaluation.  -She remains relatively asymptomatic from a coronavirus perspective.  Mild nonproductive cough but no fevers noted.  -Labs have normalized here in the hospital.  -Palliative care did evaluate the patient I do feel she is palliative appropriate based on her symptomology and issues.  Family wishes to her to remain a full code in hopes to transition to an outpatient skilled care facility where she can  move to a memory care unit.      Disposition  She will be here with this through the weekend probably skilled nursing facility on Monday if we can get the psychiatrist to see the patient in clear from his perspective.  I do not feel there is any risk for suicide or suicidal ideation at this time think most of her issues are related to situational depression, delirium and dementia.      Graeme Razo MD  [facility]  05/02/20  14:58      '

## 2020-05-02 NOTE — CONSULTS
Psychiatry has been consulted due to need for sitter to be discontinued in order for placement to be achieved.  I have reviewed this case with the Access assessment team.  There are multiple instances in the chart where the patient has denied suicidal ideation.  She has not been felt to have any threat of suicide.  She is future oriented. Suicide precautions and sitter at bedside have been discontinued as of yesterday.  Please contact Access for any additional request.

## 2020-05-03 LAB — GLUCOSE BLDC GLUCOMTR-MCNC: 207 MG/DL (ref 70–130)

## 2020-05-03 PROCEDURE — 82962 GLUCOSE BLOOD TEST: CPT

## 2020-05-03 PROCEDURE — 25010000002 ENOXAPARIN PER 10 MG: Performed by: HOSPITALIST

## 2020-05-03 RX ADMIN — SODIUM CHLORIDE, PRESERVATIVE FREE 10 ML: 5 INJECTION INTRAVENOUS at 21:47

## 2020-05-03 RX ADMIN — SPIRONOLACTONE 25 MG: 25 TABLET, FILM COATED ORAL at 08:33

## 2020-05-03 RX ADMIN — ENOXAPARIN SODIUM 40 MG: 40 INJECTION SUBCUTANEOUS at 21:47

## 2020-05-03 RX ADMIN — METOPROLOL TARTRATE 50 MG: 25 TABLET ORAL at 08:34

## 2020-05-03 RX ADMIN — OLANZAPINE 5 MG: 10 INJECTION, POWDER, FOR SOLUTION INTRAMUSCULAR at 17:26

## 2020-05-03 RX ADMIN — METOPROLOL TARTRATE 50 MG: 25 TABLET ORAL at 21:47

## 2020-05-03 RX ADMIN — OLANZAPINE 5 MG: 10 INJECTION, POWDER, FOR SOLUTION INTRAMUSCULAR at 08:35

## 2020-05-03 RX ADMIN — QUETIAPINE FUMARATE 25 MG: 25 TABLET ORAL at 21:47

## 2020-05-03 RX ADMIN — SODIUM CHLORIDE, PRESERVATIVE FREE 10 ML: 5 INJECTION INTRAVENOUS at 11:16

## 2020-05-03 RX ADMIN — AMLODIPINE BESYLATE 10 MG: 10 TABLET ORAL at 08:34

## 2020-05-03 RX ADMIN — ASPIRIN 81 MG: 81 TABLET, CHEWABLE ORAL at 08:34

## 2020-05-03 RX ADMIN — LOSARTAN POTASSIUM 50 MG: 50 TABLET, FILM COATED ORAL at 08:34

## 2020-05-03 NOTE — PLAN OF CARE
Problem: Patient Care Overview  Goal: Plan of Care Review  5/3/2020 0611 by Luisa Rodrigez RN  Outcome: Ongoing (interventions implemented as appropriate)  5/3/2020 0350 by Luisa Rodrigez RN  Outcome: Ongoing (interventions implemented as appropriate)  Flowsheets (Taken 5/3/2020 0350)  Progress: no change  Plan of Care Reviewed With: patient  Outcome Summary: Pt is AAOx1, agitated, restless, and, at times, aggressive toward staff. She is hallucinating and yelling family member's names. She is getting up repeatedly, is unable to be redirected, and her ability to follow direction is limited. She is a high falls risk and is pulling off monitoring equipment. She has been placed back into a vest restraint and is tolerating that well. VSS. Will CTM.  Goal: Individualization and Mutuality  5/3/2020 0611 by Luisa Rodrigez RN  Outcome: Ongoing (interventions implemented as appropriate)  5/3/2020 0350 by Luisa Rodrigez RN  Outcome: Ongoing (interventions implemented as appropriate)  Goal: Discharge Needs Assessment  5/3/2020 0611 by Luisa Rodrigez RN  Outcome: Ongoing (interventions implemented as appropriate)  5/3/2020 0350 by Luisa Rodrigez RN  Outcome: Ongoing (interventions implemented as appropriate)  Goal: Interprofessional Rounds/Family Conf  5/3/2020 0611 by Luisa Rodrigez RN  Outcome: Ongoing (interventions implemented as appropriate)  5/3/2020 0350 by Luisa Rodrigez RN  Outcome: Ongoing (interventions implemented as appropriate)     Problem: Fall Risk (Adult)  Goal: Absence of Fall  5/3/2020 0611 by Luisa Rodrigez RN  Outcome: Ongoing (interventions implemented as appropriate)  5/3/2020 0350 by Luisa Rodrigez RN  Outcome: Ongoing (interventions implemented as appropriate)     Problem: Infection, Risk/Actual (Adult)  Goal: Infection Prevention/Resolution  5/3/2020 0611 by Luisa Rodrigez RN  Outcome: Ongoing (interventions implemented as appropriate)  5/3/2020 0350 by Luisa Rodrigez RN  Outcome: Ongoing (interventions  implemented as appropriate)     Problem: Skin Injury Risk (Adult)  Goal: Skin Health and Integrity  5/3/2020 0611 by Luisa Rodrigez RN  Outcome: Ongoing (interventions implemented as appropriate)  5/3/2020 0350 by Luisa Rodrigez RN  Outcome: Ongoing (interventions implemented as appropriate)     Problem: Restraint, Nonbehavioral (Nonviolent)  Goal: Rationale and Justification  Outcome: Ongoing (interventions implemented as appropriate)  Flowsheets (Taken 5/1/2020 6309)  Rationale and Justification: failure of less restrictive safety measures  Goal: Nonbehavioral (Nonviolent) Restraint: Absence of Injury/Harm  Outcome: Ongoing (interventions implemented as appropriate)  Flowsheets (Taken 5/3/2020 0611)  Nonbehavioral (Nonviolent) Restraint: Absence of Injury/Harm: met  Goal: Nonbehavioral (Nonviolent) Restraint: Achievement of Discontinuation Criteria  Outcome: Ongoing (interventions implemented as appropriate)  Flowsheets (Taken 5/3/2020 0611)  Nonbehavioral (Nonviolent) Restraint: Achievement of Discontinuation Criteria: not met  Goal: Nonbehavioral (Nonviolent) Restraint: Preservation of Dignity and Wellbeing  Outcome: Ongoing (interventions implemented as appropriate)  Flowsheets (Taken 5/3/2020 0611)  Nonbehavioral (Nonviolent) Restraint: Preservation of Dignity and Wellbeing: not met

## 2020-05-03 NOTE — PROGRESS NOTES
LOS: 5 days     Name: Charlene Ruelas  Age/Sex: 87 y.o. female  :  1933        PCP: System, Provider Not In  Chief Complaint   Patient presents with   • Depression     decreased intake, spouse passed away 2 weeks ago, refusing to take medications, family requesting psych eval      Subjective   Back in restraints over night but out this AM, resting soundly with zyprexa  General: No Fever or Chills, Cardiac: No Chest Pain or Palpitations, Resp: No Cough or SOA, GI: No Nausea, Vomiting, or Diarrhea and Other: No bleeding      amLODIPine 10 mg Oral Daily   aspirin 81 mg Oral Daily   enoxaparin 40 mg Subcutaneous Q24H   losartan 50 mg Oral Daily   metoprolol tartrate 50 mg Oral Q12H   QUEtiapine 25 mg Oral Nightly   sodium chloride 10 mL Intravenous Q12H   spironolactone 25 mg Oral Daily          Objective   Vital Signs  Temp:  [97.3 °F (36.3 °C)-98.5 °F (36.9 °C)] 97.7 °F (36.5 °C)  Heart Rate:  [65-93] 65  Resp:  [14-20] 17  BP: (116-140)/(63-88) 120/63  Body mass index is 19.97 kg/m².    Intake/Output Summary (Last 24 hours) at 5/3/2020 1447  Last data filed at 5/3/2020 0835  Gross per 24 hour   Intake 200 ml   Output --   Net 200 ml       Physical Exam   Constitutional: She appears well-developed and well-nourished.   HENT:   Head: Normocephalic and atraumatic.   Cardiovascular: Normal rate, regular rhythm and normal heart sounds.   Pulmonary/Chest: Effort normal and breath sounds normal.   Abdominal: Soft. Bowel sounds are normal.   Neurological: She is alert.   Skin: Skin is warm and dry. Capillary refill takes less than 2 seconds.   Psychiatric: She has a normal mood and affect. Her behavior is normal.   Nursing note and vitals reviewed.        Results Review:       I reviewed the patient's new clinical results.  Results from last 7 days   Lab Units 20  0543 20  1150 20  0636 20  1615   WBC 10*3/mm3 7.05 8.19 6.18 6.42   HEMOGLOBIN g/dL 12.4 12.9 11.2* 11.5*   PLATELETS 10*3/mm3  499* 540* 446 411     Results from last 7 days   Lab Units 05/02/20  0717 05/01/20  0543 04/30/20  1150 04/29/20  0636 04/28/20  1447   SODIUM mmol/L  --  139 135* 139 131*   POTASSIUM mmol/L 3.9 3.7 4.0 3.8 3.4*   CHLORIDE mmol/L  --  102 97* 103 95*   CO2 mmol/L  --  25.7 27.2 25.7 25.3   BUN mg/dL  --  14 14 11 12   CREATININE mg/dL  --  0.83 0.98 0.87 0.92   CALCIUM mg/dL  --  9.5 9.8 9.2 9.9   Estimated Creatinine Clearance: 41 mL/min (by C-G formula based on SCr of 0.83 mg/dL).      Assessment/Plan     Dementia with behavioral disturbance (CMS/HCC)    Close Exposure to Covid-19 Virus    Hypertension    Diabetes mellitus (CMS/HCC)    Cancer (CMS/[facility])    COVID-19 virus detected    Failure to thrive in adult      PLAN  -She is dealing with hospital related delirium at the current time but also has significant dementia with behavioral disturbances noted at home.  She gave some vague SI threats over the course the hospitalization and tells me again today that she just wants to die.  She denies a plan.  Appreciate psychiatric clearance  -She remains relatively asymptomatic from a coronavirus perspective.  Mild nonproductive cough but no fevers noted.  -Labs have normalized here in the hospital.  -Palliative care did evaluate the patient I do feel she is palliative appropriate based on her symptomology and issues.  Family wishes to her to remain a full code in hopes to transition to an outpatient skilled care facility where she can move to a memory care unit.      Disposition  She will be here with us through the weekend probably skilled nursing facility on Monday if we can get the psychiatrist to see the patient in clear from his perspective.  I do not feel there is any risk for suicide or suicidal ideation at this time think most of her issues are related to situational depression, delirium and dementia.      Graeme Rzao MD  [facility]  05/03/20  14:47      '

## 2020-05-03 NOTE — NURSING NOTE
Pt agitated, restless, and hallucinating. She is intermittently screaming out family member's names and is preoccupied about not having her dog here. She is getting out of bed repeatedly and is unable to be redirected. She refused Lovenox and her PO meds but was given Zyprexa 5mg IM. Will CTM.

## 2020-05-03 NOTE — NURSING NOTE
Pt continues to get up repeatedly, is agitated, and is not easily redirected. She continues to hallucinate, is yelling, and is, at times, hostile. Will CTM.

## 2020-05-03 NOTE — NURSING NOTE
Per RN Luisa, the pt continues to be confused but voicing no suicidal ideations when asked. Review of chart notes mentioned earlier in the shift, the pt had to be given Zyprexa 5 mg IM to help her calm down; she was restless, preocciupied with her hallucinations and unable to be redirected.     Nurse made aware that [facility] will continue to follow pt and to contact this department if need be.

## 2020-05-03 NOTE — PLAN OF CARE
Problem: Patient Care Overview  Goal: Plan of Care Review  Outcome: Ongoing (interventions implemented as appropriate)  Flowsheets (Taken 5/3/2020 3960)  Progress: no change  Plan of Care Reviewed With: patient  Outcome Summary: Pt is AAOx1, agitated, restless, and, at times, aggressive toward staff. She is hallucinating and yelling family member's names. She is getting up repeatedly, is unable to be redirected, and her ability to follow direction is limited. She is a high falls risk and is pulling off monitoring equipment. Haldol 5mg IM was administered but was not effective. She has been placed back into a vest restraint and is tolerating that well. VSS. Will CTM.  Goal: Individualization and Mutuality  Outcome: Ongoing (interventions implemented as appropriate)  Goal: Discharge Needs Assessment  Outcome: Ongoing (interventions implemented as appropriate)  Goal: Interprofessional Rounds/Family Conf  Outcome: Ongoing (interventions implemented as appropriate)     Problem: Fall Risk (Adult)  Goal: Absence of Fall  Outcome: Ongoing (interventions implemented as appropriate)     Problem: Infection, Risk/Actual (Adult)  Goal: Infection Prevention/Resolution  Outcome: Ongoing (interventions implemented as appropriate)     Problem: Skin Injury Risk (Adult)  Goal: Skin Health and Integrity  Outcome: Ongoing (interventions implemented as appropriate)

## 2020-05-03 NOTE — PROGRESS NOTES
Access did follow up with pt's nurse. Pt has been calmer and more sleepy today. Nurse thought pt was having some hospital delirium. Access will continue to follow.

## 2020-05-04 VITALS
OXYGEN SATURATION: 90 % | TEMPERATURE: 98 F | HEIGHT: 65 IN | RESPIRATION RATE: 18 BRPM | DIASTOLIC BLOOD PRESSURE: 82 MMHG | HEART RATE: 70 BPM | BODY MASS INDEX: 19.33 KG/M2 | WEIGHT: 116 LBS | SYSTOLIC BLOOD PRESSURE: 97 MMHG

## 2020-05-04 RX ORDER — OLANZAPINE 2.5 MG/1
2.5 TABLET ORAL DAILY
Qty: 30 TABLET | Refills: 0 | Status: SHIPPED | OUTPATIENT
Start: 2020-05-04

## 2020-05-04 RX ADMIN — LOSARTAN POTASSIUM 50 MG: 50 TABLET, FILM COATED ORAL at 09:31

## 2020-05-04 RX ADMIN — METOPROLOL TARTRATE 50 MG: 25 TABLET ORAL at 09:31

## 2020-05-04 RX ADMIN — OLANZAPINE 5 MG: 10 INJECTION, POWDER, FOR SOLUTION INTRAMUSCULAR at 09:32

## 2020-05-04 RX ADMIN — SPIRONOLACTONE 25 MG: 25 TABLET, FILM COATED ORAL at 09:31

## 2020-05-04 RX ADMIN — AMLODIPINE BESYLATE 10 MG: 10 TABLET ORAL at 09:32

## 2020-05-04 RX ADMIN — ACETAMINOPHEN 650 MG: 325 TABLET, FILM COATED ORAL at 09:31

## 2020-05-04 RX ADMIN — ASPIRIN 81 MG: 81 TABLET, CHEWABLE ORAL at 09:32

## 2020-05-04 RX ADMIN — SODIUM CHLORIDE, PRESERVATIVE FREE 10 ML: 5 INJECTION INTRAVENOUS at 09:32

## 2020-05-04 RX ADMIN — OLANZAPINE 5 MG: 10 INJECTION, POWDER, FOR SOLUTION INTRAMUSCULAR at 01:06

## 2020-05-04 NOTE — PLAN OF CARE
Problem: Patient Care Overview  Goal: Plan of Care Review  Outcome: Outcome(s) achieved  Goal: Individualization and Mutuality  Outcome: Outcome(s) achieved  Goal: Discharge Needs Assessment  Outcome: Outcome(s) achieved  Goal: Interprofessional Rounds/Family Conf  Outcome: Outcome(s) achieved     Problem: Fall Risk (Adult)  Goal: Absence of Fall  Outcome: Outcome(s) achieved     Problem: Infection, Risk/Actual (Adult)  Goal: Infection Prevention/Resolution  Outcome: Outcome(s) achieved     Problem: Skin Injury Risk (Adult)  Goal: Skin Health and Integrity  Outcome: Outcome(s) achieved

## 2020-05-04 NOTE — PLAN OF CARE
Problem: Patient Care Overview  Goal: Plan of Care Review  Outcome: Ongoing (interventions implemented as appropriate)  Flowsheets (Taken 5/4/2020 9854)  Progress: no change  Plan of Care Reviewed With: patient  Outcome Summary: pt only oriented to self, disoriented to place time and situation. remains out of the vest restraint. frequently gets out of bed, but she is sleeping comfortably currently with PRN xyprexa and scheduled seroquel given. VSS. room air. discharge soon to a skilled nursing facility. will continue to monitor.

## 2020-05-04 NOTE — PROGRESS NOTES
Continued Stay Note  [facility]     Patient Name: Charlene Ruelas  MRN: [id number]  Today's Date: 5/4/2020    Admit Date: 4/28/2020    Discharge Plan     Row Name 05/04/20 0905       Plan    Plan  Tentitive skilled rehab at [facility] with [facility] ambulance  at 3pm pending psych clearance     Plan Comments  Spoke with Enmanuel Ponce/ [facility].  She states if patient is cleared by [facility] and she takes her medications PO then plan for [facility] ambulance today at 3pm.  .............................Geno Muro RN        Discharge Codes    No documentation.       Expected Discharge Date and Time     Expected Discharge Date Expected Discharge Time    May 1, 2020             Geno Muro RN

## 2020-05-04 NOTE — PROGRESS NOTES
"Physicians Statement of Medical Necessity for  Ambulance Transportation    GENERAL INFORMATION     Name: Charlene Ruelas  YOB: 1933  Medicare #: [id number]  Transport Date:           (Valid for round trips this date, or for scheduled repetitive trips for 60 days from the date signed below.)  [facility]   Destination: [facility]  Is the Patient's stay covered under Medicare Part A (PPS/DRG?)Yes   Closest appropriate facility? Yes  If this a hosp-hosp transfer? No  Is this a hospice patient? No    MEDICAL NECESSITY QUESTIONAIRE    Ambulance Transportation is medically necessary only if other means of transportation are contraindicated or would be potentially harmful to the patient.  To meet this requirement, the patient must be either \"bed confined\" or suffer from a condition such that transport by means other than an ambulance is contraindicated by the patient's condition.  The following questions must be answered by the healthcare professional signing below for this form to be valid:     1) Describe the MEDICAL CONDITION (physical and/or mental) of this patient AT THE TIME OF AMBULANCE TRANSPORT that requires the patient to be transported in an ambulance, and why transport by other means is contraindicated by the patient's condition: Covid 19  Past Medical History:   Diagnosis Date   • Cancer (CMS/HCC)     fatty tumor   • Dementia (CMS/HCC)    • Diabetes mellitus (CMS/HCC)    • Hypertension       History reviewed. No pertinent surgical history.   2) Is this patient \"bed confined\" as defined below?Yes    To be \"bed confined\" the patient must satisfy all three of the following criteria:  (1) unable to get up from bed without assistance; AND (2) unable to ambulate;  AND (3) unable to sit in a chair or wheelchair.  3) Can this patient safely be transported by car or wheelchair van (I.e., may safely sit during transport, without an attendant or monitoring?)No   4. In addition to " completing questions 1-3 above, please check any of the following conditions that apply*:          *Note: supporting documentation for any boxes checked must be maintained in the patient's medical records Patient is confused and Special handling/isolation/infection control precautions required      SIGNATURE OF PHYSICIAN OR OTHER AUTHORIZED HEALTHCARE PROFESSIONAL    I certify that the above information is true and correct based on my evaluation of this patient, and represent that the patient requires transport by ambulance and that other forms of transport are contraindicated.  I understand that this information will be used by the Centers for Medicare and Medicaid Services (CMS) to support the determiniation of medical necessity for ambulance services, and I represent that I have personal knowledge of the patient's condition at the time of transport.       If this box is checked, I also certify that the patient is physically or mentally incapable of signing the ambulance service's claim form and that the institution with which I am affiliated has furnished care, services or assistance to the patient.  My signature below is made on behalf of the patient pursuant to 42 .36(b)(4). In accordance with 42 .37, the specific reason(s) that the patient is physically or mentally incapable of signing the claim for is as follows:     Signature of Physician or Healthcare Professional    Geno Muro RN       Date/Time:   5/4/2020 11:00       (For Scheduled repetitive transport, this form is not valid for transports performed more than 60 days after this date).                                                                                                                                            --------------------------------------------------------------------------------------------  Printed Name and Credentials of Physician or Authorized Healthcare Professional     *Form must be signed by patient's  attending physician for scheduled, repetitive transports,.  For non-repetitive ambulance transports, if unable to obtain the signature of the attending physician, any of the following may sign (please select below):     Physician  Clinical Nurse Specialist  Registered Nurse     Physician Assistant  Discharge Planner  Licensed Practical Nurse     Nurse Practitioner

## 2020-05-04 NOTE — PROGRESS NOTES
[facility] Ctr Note.    Chart reviewed.     Pt scheduled for discharge to [facility]; per RN, Anahy,  in progress.   Confirmed w/RN that Pt was cleared by Psychiatrist on 5/2/2020.    Access sign off.

## 2020-05-04 NOTE — PROGRESS NOTES
Continued Stay Note  [facility]     Patient Name: Charlene Ruelas  MRN: [id number]  Today's Date: 5/4/2020    Admit Date: 4/28/2020    Discharge Plan     Row Name 05/04/20 1047       Plan    Plan  Possible dc today to skilled rehab at [facility] with ambulance to  at 3pm    Plan Comments  Per incoming call from Dtr Sara Ruelas, discussed [place] plans.  She was concerned as she has looked at Medicare.gov for ratings of [facility].  Discussed home at [place] with 24/7 care and home health.  She states that  could not work.  She prefers plans for [facility] with long term memory care to follow and she will tour [place] at some point.  Packet in chart with report and fax..........................Geno Muro RN    Row Name 05/04/20 0905       Plan    Plan  Tentitive skilled rehab at [facility] with [facility] ambulance  at 3pm pending psych clearance     Plan Comments  Spoke with Enmanuel Ponce/ [facility].  She states if patient is cleared by [facility] and she takes her medications PO then plan for [facility] ambulance today at 3pm.  .............................Geno Muro RN        Discharge Codes    No documentation.       Expected Discharge Date and Time     Expected Discharge Date Expected Discharge Time    May 1, 2020             Geno Muro RN

## 2020-05-04 NOTE — PROGRESS NOTES
Case Management Discharge Note      Final Note: [facility]     Provided Post Acute Provider Quality & Resource List?: Yes  Post Acute Provider Quality and Resource List: Nursing Home  Delivered To: Support Person  Method of Delivery: Telephone    Destination - Selection Complete      Service Provider Request Status Selected Services Address Phone Number Fax Number    [facility] Selected Skilled Nursing [address] [facility] [id number] [phone]      Durable Medical Equipment      No service has been selected for the patient.      Dialysis/Infusion      No service has been selected for the patient.      Home Medical Care      No service has been selected for the patient.      Therapy      No service has been selected for the patient.      Community Resources      No service has been selected for the patient.        Transportation Services  Air Ambulance: Other([facility])    Final Discharge Disposition Code: 03 - skilled nursing facility (SNF)

## 2020-05-04 NOTE — PROGRESS NOTES
Continued Stay Note  [facility]     Patient Name: Charlene Ruelas  MRN: [id number]  Today's Date: 5/4/2020    Admit Date: 4/28/2020    Discharge Plan     Row Name 05/04/20 1447       Plan    Plan  Dc to [facility] via [place] [facility]    Plan Comments  Spoke with Rosario and she accepts for skilled rehab.  Spoke with son Emery Ruelas at [phone].  Notified of dc orders and he agrees with plan.  He states his brother Art Mesa is the eldest and will be working on POA if needed.  He did not provide me with his phone number.  He did provide his bother Oh and his wife Sara Ruelas' phone number [phone].  Left  to notify that ambulance will  at 3pm. Packet provided to nursing.  ..............................Geno Muro RN         Discharge Codes    No documentation.       Expected Discharge Date and Time     Expected Discharge Date Expected Discharge Time    May 1, 2020             Geno Muro RN

## 2020-05-04 NOTE — PROGRESS NOTES
Continued Stay Note  [facility]     Patient Name: Charlene Ruelas  MRN: [id number]  Today's Date: 5/4/2020    Admit Date: 4/28/2020    Discharge Plan     Row Name 05/04/20 1047       Plan    Plan  Possible dc today to skilled rehab at [facility] with ambulance to  at 3pm    Plan Comments  Per incoming call from Dtr Sara Ruelas, discussed [facility] plans.  She was concerned as she has looked at Medicare.gov for ratings of [facility].  Discussed home at [facility] with 24/7 care and home health.  She states could not work.  She prefers plans for [facility] with long term memory care to follow and she will tour [place] at some point.  Packet in chart with report and fax..........................Geno Muro RN    Row Name 05/04/20 0905       Plan    Plan  Tentitive skilled rehab at [facility] with [facility]  at 3pm pending psych clearance     Plan Comments  Spoke with Enmanuel Ponce/ [facility].  She states if patient is cleared by [facility] and she takes her medications PO then plan for [facility] ambulance today at 3pm.  .............................Geno Muro RN        Discharge Codes    No documentation.       Expected Discharge Date and Time     Expected Discharge Date Expected Discharge Time    May 1, 2020             Geno Muro RN

## 2020-05-05 NOTE — PROGRESS NOTES
Continued Stay Note  [facility]     Patient Name: Charlene Ruelas  MRN: [id number]  Today's Date: 5/5/2020    Admit Date: 4/28/2020    Discharge Plan     Row Name 05/05/20 1036       Plan    Plan Comments   for  with [facility]PS Sherita Robertson with case # [id number] to notify that patient was DC to [facility] for short term skilled rehab.........................Geno Muro RN        Discharge Codes    No documentation.       Expected Discharge Date and Time     Expected Discharge Date Expected Discharge Time    May 1, 2020             Geno Muro RN